# Patient Record
Sex: FEMALE | Race: ASIAN | Employment: UNEMPLOYED | URBAN - METROPOLITAN AREA
[De-identification: names, ages, dates, MRNs, and addresses within clinical notes are randomized per-mention and may not be internally consistent; named-entity substitution may affect disease eponyms.]

---

## 2019-03-07 ENCOUNTER — TRANSFERRED RECORDS (OUTPATIENT)
Dept: MULTI SPECIALTY CLINIC | Facility: CLINIC | Age: 34
End: 2019-03-07

## 2019-03-07 LAB
CHOLEST SERPL-MCNC: 163 MG/DL (ref 100–199)
HDLC SERPL-MCNC: 36 MG/DL
HPV ABSTRACT: NORMAL
LDLC SERPL CALC-MCNC: 102 MG/DL
NONHDLC SERPL-MCNC: 127 MG/DL
PAP-ABSTRACT: NORMAL
TRIGL SERPL-MCNC: 124 MG/DL
TSH SERPL-ACNC: 2.13 UIU/ML (ref 0.35–4.94)

## 2020-06-01 ENCOUNTER — HOSPITAL ENCOUNTER (EMERGENCY)
Facility: CLINIC | Age: 35
Discharge: HOME | End: 2020-06-01
Attending: EMERGENCY MEDICINE | Admitting: EMERGENCY MEDICINE
Payer: COMMERCIAL

## 2020-06-01 ENCOUNTER — NURSE TRIAGE (OUTPATIENT)
Dept: NURSING | Facility: CLINIC | Age: 35
End: 2020-06-01

## 2020-06-01 ENCOUNTER — APPOINTMENT (OUTPATIENT)
Dept: ULTRASOUND IMAGING | Facility: CLINIC | Age: 35
End: 2020-06-01
Attending: EMERGENCY MEDICINE
Payer: COMMERCIAL

## 2020-06-01 VITALS
BODY MASS INDEX: 22.89 KG/M2 | HEART RATE: 91 BPM | DIASTOLIC BLOOD PRESSURE: 78 MMHG | SYSTOLIC BLOOD PRESSURE: 119 MMHG | TEMPERATURE: 98.8 F | RESPIRATION RATE: 16 BRPM | OXYGEN SATURATION: 98 % | HEIGHT: 61 IN | WEIGHT: 121.25 LBS

## 2020-06-01 DIAGNOSIS — R31.9 URINARY TRACT INFECTION WITH HEMATURIA, SITE UNSPECIFIED: ICD-10-CM

## 2020-06-01 DIAGNOSIS — N39.0 URINARY TRACT INFECTION WITH HEMATURIA, SITE UNSPECIFIED: ICD-10-CM

## 2020-06-01 DIAGNOSIS — N83.8 MASS OF RIGHT OVARY: ICD-10-CM

## 2020-06-01 DIAGNOSIS — R10.2 PELVIC PAIN IN FEMALE: ICD-10-CM

## 2020-06-01 LAB
ALBUMIN UR-MCNC: NEGATIVE MG/DL
ANION GAP SERPL CALCULATED.3IONS-SCNC: 6 MMOL/L (ref 3–14)
APPEARANCE UR: ABNORMAL
B-HCG SERPL-ACNC: >1000 IU/L (ref 0–5)
BACTERIA #/AREA URNS HPF: ABNORMAL /HPF
BASOPHILS # BLD AUTO: 0 10E9/L (ref 0–0.2)
BASOPHILS NFR BLD AUTO: 0.3 %
BILIRUB UR QL STRIP: NEGATIVE
BUN SERPL-MCNC: 5 MG/DL (ref 7–30)
CALCIUM SERPL-MCNC: 8.7 MG/DL (ref 8.5–10.1)
CHLORIDE SERPL-SCNC: 108 MMOL/L (ref 94–109)
CO2 SERPL-SCNC: 23 MMOL/L (ref 20–32)
COLOR UR AUTO: ABNORMAL
CREAT SERPL-MCNC: 0.55 MG/DL (ref 0.52–1.04)
DIFFERENTIAL METHOD BLD: ABNORMAL
EOSINOPHIL # BLD AUTO: 0.1 10E9/L (ref 0–0.7)
EOSINOPHIL NFR BLD AUTO: 1.2 %
ERYTHROCYTE [DISTWIDTH] IN BLOOD BY AUTOMATED COUNT: 12.9 % (ref 10–15)
GFR SERPL CREATININE-BSD FRML MDRD: >90 ML/MIN/{1.73_M2}
GLUCOSE SERPL-MCNC: 143 MG/DL (ref 70–99)
GLUCOSE UR STRIP-MCNC: NEGATIVE MG/DL
HCT VFR BLD AUTO: 34.5 % (ref 35–47)
HGB BLD-MCNC: 11.5 G/DL (ref 11.7–15.7)
HGB UR QL STRIP: NEGATIVE
IMM GRANULOCYTES # BLD: 0 10E9/L (ref 0–0.4)
IMM GRANULOCYTES NFR BLD: 0.3 %
KETONES UR STRIP-MCNC: NEGATIVE MG/DL
LEUKOCYTE ESTERASE UR QL STRIP: ABNORMAL
LYMPHOCYTES # BLD AUTO: 2.1 10E9/L (ref 0.8–5.3)
LYMPHOCYTES NFR BLD AUTO: 29.8 %
MCH RBC QN AUTO: 29.6 PG (ref 26.5–33)
MCHC RBC AUTO-ENTMCNC: 33.3 G/DL (ref 31.5–36.5)
MCV RBC AUTO: 89 FL (ref 78–100)
MONOCYTES # BLD AUTO: 0.5 10E9/L (ref 0–1.3)
MONOCYTES NFR BLD AUTO: 6.8 %
NEUTROPHILS # BLD AUTO: 4.3 10E9/L (ref 1.6–8.3)
NEUTROPHILS NFR BLD AUTO: 61.6 %
NITRATE UR QL: NEGATIVE
NRBC # BLD AUTO: 0 10*3/UL
NRBC BLD AUTO-RTO: 0 /100
PH UR STRIP: 6.5 PH (ref 5–7)
PLATELET # BLD AUTO: 330 10E9/L (ref 150–450)
POTASSIUM SERPL-SCNC: 3.6 MMOL/L (ref 3.4–5.3)
RBC # BLD AUTO: 3.89 10E12/L (ref 3.8–5.2)
RBC #/AREA URNS AUTO: 10 /HPF (ref 0–2)
SODIUM SERPL-SCNC: 137 MMOL/L (ref 133–144)
SOURCE: ABNORMAL
SP GR UR STRIP: 1 (ref 1–1.03)
SQUAMOUS #/AREA URNS AUTO: 17 /HPF (ref 0–1)
UROBILINOGEN UR STRIP-MCNC: NORMAL MG/DL (ref 0–2)
WBC # BLD AUTO: 7.2 10E9/L (ref 4–11)
WBC #/AREA URNS AUTO: 37 /HPF (ref 0–5)

## 2020-06-01 PROCEDURE — 80048 BASIC METABOLIC PNL TOTAL CA: CPT | Performed by: EMERGENCY MEDICINE

## 2020-06-01 PROCEDURE — 99284 EMERGENCY DEPT VISIT MOD MDM: CPT | Mod: 25

## 2020-06-01 PROCEDURE — 87086 URINE CULTURE/COLONY COUNT: CPT | Performed by: EMERGENCY MEDICINE

## 2020-06-01 PROCEDURE — 81001 URINALYSIS AUTO W/SCOPE: CPT | Performed by: EMERGENCY MEDICINE

## 2020-06-01 PROCEDURE — 85025 COMPLETE CBC W/AUTO DIFF WBC: CPT | Performed by: EMERGENCY MEDICINE

## 2020-06-01 PROCEDURE — 84702 CHORIONIC GONADOTROPIN TEST: CPT | Performed by: EMERGENCY MEDICINE

## 2020-06-01 PROCEDURE — 76817 TRANSVAGINAL US OBSTETRIC: CPT

## 2020-06-01 RX ORDER — CEPHALEXIN 500 MG/1
500 CAPSULE ORAL 3 TIMES DAILY
Qty: 21 CAPSULE | Refills: 0 | Status: SHIPPED | OUTPATIENT
Start: 2020-06-01 | End: 2020-06-16

## 2020-06-01 RX ORDER — CEPHALEXIN 500 MG/1
500 CAPSULE ORAL ONCE
Status: DISCONTINUED | OUTPATIENT
Start: 2020-06-01 | End: 2020-06-01 | Stop reason: HOSPADM

## 2020-06-01 ASSESSMENT — MIFFLIN-ST. JEOR: SCORE: 1187.38

## 2020-06-01 ASSESSMENT — ENCOUNTER SYMPTOMS
ABDOMINAL PAIN: 1
BLOOD IN STOOL: 0
CONSTIPATION: 0
COUGH: 0
DYSURIA: 0
DIARRHEA: 0
SHORTNESS OF BREATH: 0
FREQUENCY: 1
VOMITING: 0

## 2020-06-01 NOTE — ED NOTES
Pt has had abd discomfort since may 22. She took a pregnancy test yesterday and it was positive. She states her pain is more on the left side of her abdomen.

## 2020-06-01 NOTE — TELEPHONE ENCOUNTER
Positive pregnancy test this weekend/ LMP 4/19/past couple of days LLQ abdominal pain/at first she thought she was having her menses but the pain gets severe when she sneezes//worried about ectopic pregnancy/  will drive her to the er at [facility] Luis M Be RN [phone]    Reason for Disposition    MODERATE-SEVERE abdominal pain (e.g., interferes with normal activities, awakens from sleep)    Additional Information    Negative: Passed out (i.e., lost consciousness, collapsed and was not responding)    Negative: Shock suspected (e.g., cold/pale/clammy skin, too weak to stand, low BP, rapid pulse)    Negative: Difficult to awaken or acting confused (e.g., disoriented, slurred speech)    Negative: Sounds like a life-threatening emergency to the triager    Negative: Followed an abdomen (stomach) injury    Negative: [1] Abdominal pain AND [2] pregnant > 20 weeks    Protocols used: PREGNANCY - ABDOMINAL PAIN LESS THAN 20 WEEKS EG-A-

## 2020-06-01 NOTE — ED PROVIDER NOTES
"  History     Chief Complaint:  Abdominal Pain     The history is provided by the patient.      Sherrill James is a A0 pregnant 34 year old female who presents for lower abdominal pain. The patient reports she has had intermittent left lower abdominal pain since 20 with associated urinary frequency and urgency. She states her last period was  and she has had fairly regular periods for the past 5-6 months. She reports taking a home pregnancy test yesterday which was positive. She denies vaginal bleeding, discharge, vomiting, diarrhea, blood in stool, constipation, dysuria, chest pain, shortness of breath, cough, and history of bladder infection or kidney stones.  She states her blood type is A+. She notes her son was born in [place] but believed [facility] has records.     Allergies:  No Known Drug Allergies    Medications:    The patient is not currently taking any prescribed medications.    Past Medical History:    History reviewed. No pertinent past medical history.    Past Surgical History:    History reviewed.  No pertinent past surgical history.    Family History:    History reviewed. No pertinent family history.    Social History:  The patient presents to the ED alone.  PCP: No primary care provider on file.     Review of Systems   Respiratory: Negative for cough and shortness of breath.    Cardiovascular: Negative for chest pain.   Gastrointestinal: Positive for abdominal pain. Negative for blood in stool, constipation, diarrhea and vomiting.   Genitourinary: Positive for frequency and urgency. Negative for dysuria, vaginal bleeding and vaginal discharge.   All other systems reviewed and are negative.      Physical Exam     Patient Vitals for the past 24 hrs:   BP Temp Pulse Heart Rate SpO2 Height Weight   20 1456 128/81 98.8  F (37.1  C) 97 97 97 % 1.549 m (5' 1\") 55 kg (121 lb 4.1 oz)       Physical Exam  Physical Exam   General:  Sitting on bed at bedside.   HENT:  No obvious trauma to " head  Right Ear:  External ear normal.   Left Ear:  External ear normal.   Nose:  Nose normal.   Eyes:  Conjunctivae and EOM are normal. Pupils are equal, round, and reactive.   Neck: Normal range of motion. Neck supple. No tracheal deviation present.   CV:  Normal heart sounds. No murmur heard.  Pulm/Chest: Effort normal and breath sounds normal.   Abd: Soft. No distension. There is no tenderness, specifically no lower pelvic tenderness. There is no rigidity, no rebound and no guarding. Negative Murphies and negative McBurnies.    M/S: Normal range of motion.   Neuro: Alert. GCS 15.  Skin: Skin is warm and dry. No rash noted. Not diaphoretic.   Psych: Normal mood and affect. Behavior is normal.     Emergency Department Course   Imaging:  Radiology findings were communicated with the patient who voiced understanding of the findings.    US OB <14 Weeks w Transvaginal Single  1. Intrauterine 0.5 cm gestational sac. Lack of cardiac activity and   fetal pole likely related to early gestational age. Short interval   follow-up and serial beta hCG suggested.   2. Large right adnexal mass is likely a dermoid. Pelvic MRI may be   helpful for further characterization.   As read by Radiology.    Laboratory:  Laboratory findings were communicated with the patient who voiced understanding of the findings.    CBC: HGB 11.5 (L) o/w WNL (WBC 7.2, )  BMP: Glucose 143 (H), BUN 5 (L) o/w WNL (Creatinine 0.55)  UA: Large leukocyte esterase, WBC/HPF 37 (H), RBC/HPF 10 (H), Many bacteria, Squamous epithelial/HPF 17 (H) o/w WNL  HCG Quantitative Pregnancy: >1000 (H)  Urine culture: pending    Interventions:  Keflex 500 mg PO    Emergency Department Course:  Past medical records, nursing notes, and vitals reviewed.  1508: I performed an exam of the patient and obtained history, as documented above.  The patient was sent for a OB ultrasound while in the emergency department, findings above.  IV inserted and blood drawn.  The patient  provided a urine sample here in the emergency department. This was sent for laboratory testing, findings above.    1658: I rechecked the patient.  Findings and plan explained to the Patient. Patient discharged home with instructions regarding supportive care, medications, and reasons to return. The importance of close follow-up was reviewed.     Impression & Plan   Medical Decision Making:  Sherrill James is a very pleasant 34 year old year old patient who presents to the emergency department with concern of dysuria and increased urinary frequency.  The patient is concerned about a bladder infection.  She was also concerned because she had very slight left lower quadrant pain.  The patient has a benign, nonsurgical abdomen.  Labs are unremarkable.  Her quant is elevated consistent with early pregnancy.  She is sure that her blood type is A+.  She does not require RhoGam because of this and she has not had any vaginal bleeding anyways.  The urine analysis shows evidence of infection.  A urine culture is pending.  She was given Keflex here and a prescription for this.  I discussed that having pelvic pain in early pregnancy can be a sign of early miscarriage.  Recommended she return with any worsening pain or bleeding.  Incidentally there is a large mass on the right ovary.  There is blood flow to it.  Ovarian torsion is unlikely.  The patient's pain is in the left lower quadrant and is minimal and she has a nonsurgical abdomen.  I recommended she follow-up with OB/GYN and likely outpatient MRI per radiology's recommendation.  I considered the very rare presentation of this being a heterotopic pregnancy and reviewed this with the patient.  I advised her to return immediately with any worsening pain or vaginal bleeding.  The patient expressed verbal understanding and agreement.    The treatment plan was discussed with the patient and they expressed understanding of this plan and consented to the plan.  In addition,  the patient will return to the emergency department if their symptoms persist, worsen, if new symptoms arise or if there is any concern as other pathology may be present that is not evident at this time. They also understand the importance of close follow up in the clinic and if unable to do so will return to the emergency department for a reevaluation. All questions were answered.    Diagnosis:    ICD-10-CM    1. Urinary tract infection with hematuria, site unspecified  N39.0     R31.9    2. Pelvic pain in female  R10.2    3. Mass of right ovary  N83.8        Disposition:  Discharged to home.    Discharge Medications:  New Prescriptions    CEPHALEXIN (KEFLEX) 500 MG CAPSULE    Take 1 capsule (500 mg) by mouth 3 times daily for 7 days     Scribe Disclosure:  Binu RODRIGUEZ, am serving as a scribe at 3:08 PM on 6/1/2020 to document services personally performed by Sam Vann DO based on my observations and the provider's statements to me.        Sam Vann DO  06/01/20 [id number]

## 2020-06-01 NOTE — ED TRIAGE NOTES
Patient had home pregnancy test positive, having abdomen pain last 4-5 hours. Was told to come to ED for evaluation.

## 2020-06-01 NOTE — ED AVS SNAPSHOT
Emergency Department  [address] [facility] [address]  Phone:  [phone]  Fax:  [phone]                                    Ms. Sherrill James   MRN: [id number]    Department:   Emergency Department   Date of Visit:  6/1/2020           After Visit Summary Signature Page    I have received my discharge instructions, and my questions have been answered. I have discussed any challenges I see with this plan with the nurse or doctor.    ..........................................................................................................................................  Patient/Patient Representative Signature      ..........................................................................................................................................  Patient Representative Print Name and Relationship to Patient    ..................................................               ................................................  Date                                   Time    ..........................................................................................................................................  Reviewed by Signature/Title    ...................................................              ..............................................  Date                                               Time          22EPIC Rev 08/18

## 2020-06-02 LAB
BACTERIA SPEC CULT: NORMAL
BACTERIA SPEC CULT: NORMAL
Lab: NORMAL
SPECIMEN SOURCE: NORMAL

## 2020-06-12 DIAGNOSIS — O36.80X0 PREGNANCY WITH INCONCLUSIVE FETAL VIABILITY: Primary | ICD-10-CM

## 2020-06-12 NOTE — PROGRESS NOTES
Pt has NOB scheduled with ultrasound. Needed order to be placed. Future ultrasound order placed and linked with appt. Closing encounter.   Chantel Tapia RN on 6/12/2020 at 9:25 AM

## 2020-06-15 NOTE — PROGRESS NOTES
"  SUBJECTIVE:                                                   Sherrill James is a 34 year old female who presents to clinic today for the following health issue(s):  Patient presents with:  Ultrasound      HPI:  Here for viability ultrasound. Pt was seen in ER for pain and ultrasound showed 5weeks sac. No fetal pole. Also found 9cm Dermoid cyst of right ovary.  Pt feeling well. No bleeding.   Ultrasound today shows 7 week viable IUP and 9cm dermoid.   PMH:  section first pregnancy    Patient's last menstrual period was 2020..     Patient is sexually active, .  Using none for contraception.    reports that she has never smoked. She has never used smokeless tobacco.    STD testing offered?  Declined    Health maintenance updated:  yes    Today's PHQ-2 Score: No flowsheet data found.  Today's PHQ-9 Score:   PHQ-9 SCORE 2020   PHQ-9 Total Score 0     Today's JOSE JUAN-7 Score:   JOSE JUAN-7 SCORE 2020   Total Score 0       Problem list and histories reviewed & adjusted, as indicated.  Additional history: as documented.    There is no problem list on file for this patient.    Past Surgical History:   Procedure Laterality Date     NO HISTORY OF SURGERY        Social History     Tobacco Use     Smoking status: Never Smoker     Smokeless tobacco: Never Used   Substance Use Topics     Alcohol use: Never     Frequency: Never      Problem (# of Occurrences) Relation (Name,Age of Onset)    No Known Problems (7) Mother, Father, Sister, Brother, Maternal Grandmother, Maternal Grandfather, Paternal Grandmother            Current Outpatient Medications   Medication Sig     Prenatal Vit-Fe Fumarate-FA (PRENATAL VITAMIN PO)      No current facility-administered medications for this visit.      No Known Allergies    ROS:  12 point review of systems negative other than symptoms noted below or in the HPI.        OBJECTIVE:     /74   Pulse 84   Ht 1.549 m (5' 1\")   Wt 55.8 kg (123 lb)   LMP 2020   " BMI 23.24 kg/m    Body mass index is 23.24 kg/m .    Exam:  Constitutional:  Appearance: Well nourished, well developed alert, in no acute distress  Neurologic:  Mental Status:  Oriented X3.  Normal strength and tone, sensory exam grossly normal, mentation intact and speech normal.    Psychiatric:  Mentation appears normal and affect normal/bright.     In-Clinic Test Results:  Results for orders placed or performed in visit on 06/16/20 (from the past 24 hour(s))   US OB Transvaginal Only    Narrative    US OB Transvaginal Only   Order #: [id number] Accession #: [id number]   Study Notes      Kaitlin Paul on 6/16/2020  8:43 AM       Obstetrical Ultrasound Report  OB U/S  < 14 Weeks -  Transvaginal  [facility]  Referring Provider: Dr. Addison Alegria  Sonographer: Kaitlin Paul RDMS  Indication:  Viability check      Dating (mm/dd/yyyy):   LMP: 04/19/20                 EDC:  01/24/21  GA by LMP:         8w2d     Current Scan On:  6/16/2020          EDC:  02/01/21  GA by Current Scan:        7w1d  The calculation of the gestational age by current scan was based on CRL.  Anatomy Scan:  Pacheco gestation.  Biometry:  CRL                       .98 cm                  7w1d                      Yolk Sac               2.7 mm                                                     Fetal heart activity:  Rate and rhythm is within normal limits.  Fetal   heart rate: 122 bpm  Findings: Viable IUP     Maternal Structures:  Cervix: The cervix appears long and closed.  Right Ovary: Dermoid 9.5x 7x 4.6cm  Left Ovary: Multiple small cysts on periphery  Impression: Normal interval growth of single, viable intrauterine   pregnancy. EDC should be 2/1/21. Right ovarian dermoid still present and   unchanged. Normal left ovary.   Addison Alegria MD                      ASSESSMENT/PLAN:                                                        ICD-10-CM    1. High-risk pregnancy in first trimester  O09.91    2. Dermoid  cyst  D36.9    3. History of  section complicating pregnancy  O34.219          EDC will be 21  Discussed Dermoid. Will need ultrasound at 14 weeks and refer to GYN onc for removal.   Pt given notes and questions answered. Discussed potential for torsion post partum if not removed. Discussed potential difficulty with excision at time of .  RTC in 3 weeks for SHABANA Alegria MD  [facility]

## 2020-06-16 ENCOUNTER — OFFICE VISIT (OUTPATIENT)
Dept: OBGYN | Facility: CLINIC | Age: 35
End: 2020-06-16
Payer: COMMERCIAL

## 2020-06-16 ENCOUNTER — ANCILLARY PROCEDURE (OUTPATIENT)
Dept: ULTRASOUND IMAGING | Facility: CLINIC | Age: 35
End: 2020-06-16
Payer: COMMERCIAL

## 2020-06-16 VITALS
BODY MASS INDEX: 23.22 KG/M2 | WEIGHT: 123 LBS | SYSTOLIC BLOOD PRESSURE: 116 MMHG | DIASTOLIC BLOOD PRESSURE: 74 MMHG | HEART RATE: 84 BPM | HEIGHT: 61 IN

## 2020-06-16 DIAGNOSIS — O34.219 HISTORY OF CESAREAN SECTION COMPLICATING PREGNANCY: ICD-10-CM

## 2020-06-16 DIAGNOSIS — D36.9 DERMOID CYST: ICD-10-CM

## 2020-06-16 DIAGNOSIS — O36.80X0 PREGNANCY WITH INCONCLUSIVE FETAL VIABILITY: ICD-10-CM

## 2020-06-16 DIAGNOSIS — O09.91 HIGH-RISK PREGNANCY IN FIRST TRIMESTER: Primary | ICD-10-CM

## 2020-06-16 PROCEDURE — 76817 TRANSVAGINAL US OBSTETRIC: CPT | Performed by: OBSTETRICS & GYNECOLOGY

## 2020-06-16 PROCEDURE — 99202 OFFICE O/P NEW SF 15 MIN: CPT | Performed by: OBSTETRICS & GYNECOLOGY

## 2020-06-16 SDOH — HEALTH STABILITY: MENTAL HEALTH: HOW OFTEN DO YOU HAVE A DRINK CONTAINING ALCOHOL?: NEVER

## 2020-06-16 ASSESSMENT — ANXIETY QUESTIONNAIRES
5. BEING SO RESTLESS THAT IT IS HARD TO SIT STILL: NOT AT ALL
6. BECOMING EASILY ANNOYED OR IRRITABLE: NOT AT ALL
7. FEELING AFRAID AS IF SOMETHING AWFUL MIGHT HAPPEN: NOT AT ALL
1. FEELING NERVOUS, ANXIOUS, OR ON EDGE: NOT AT ALL
3. WORRYING TOO MUCH ABOUT DIFFERENT THINGS: NOT AT ALL
2. NOT BEING ABLE TO STOP OR CONTROL WORRYING: NOT AT ALL
GAD7 TOTAL SCORE: 0

## 2020-06-16 ASSESSMENT — PATIENT HEALTH QUESTIONNAIRE - PHQ9
SUM OF ALL RESPONSES TO PHQ QUESTIONS 1-9: 0
5. POOR APPETITE OR OVEREATING: NOT AT ALL

## 2020-06-16 ASSESSMENT — MIFFLIN-ST. JEOR: SCORE: 1195.3

## 2020-06-16 NOTE — Clinical Note
Please abstract the following data from this visit with this patient into the appropriate field in Epic:    Other Tests found in the patient's chart through Chart Review/Care Everywhere:    Pap smear done by this group Cammie this date: 3/7/19    Note to [employer]: If this section is blank, no results were found via Chart Review/Care Everywhere.

## 2020-06-17 ASSESSMENT — ANXIETY QUESTIONNAIRES: GAD7 TOTAL SCORE: 0

## 2020-06-19 ENCOUNTER — TELEPHONE (OUTPATIENT)
Dept: OBGYN | Facility: CLINIC | Age: 35
End: 2020-06-19

## 2020-06-19 DIAGNOSIS — O09.90 SUPERVISION OF HIGH-RISK PREGNANCY: Primary | ICD-10-CM

## 2020-06-19 DIAGNOSIS — D36.9 DERMOID CYST: ICD-10-CM

## 2020-06-19 NOTE — TELEPHONE ENCOUNTER
Patients  called and asked to speak with a nurse regarding scheduling his wife per notes from the last appointment with Dr Alegria on 6/16: Discussed Dermoid. Will need ultrasound at 14 weeks and refer to GYN onc for removal.   Please call back at [phone]

## 2020-06-19 NOTE — TELEPHONE ENCOUNTER
"Returned husbands call  They are asking to have the \"14week US\" ASAP so they can take care of the dermoid cyst prior to their trip back to [facility] July 15th.   Discussed the risk in 1st trimester thus the reason for the 14week f/u and then referral if dermoid cyst still there.   asked if they terminated pregnancy, can this be taken care of?    Consulted Dr Alegria - GYN oncology won't normally see her before 16 weeks but he can send the referral for consult and discussion asap to avoid TAB.  Referral placed and number and instructions given to pt's . They can discuss and be advised by the GYN Oncologist how to proceed.    Reminded to schedule new OB visit. He will call back.    Valerie Lehman RN on 6/19/2020 at 1:42 PM    "

## 2020-06-24 NOTE — TELEPHONE ENCOUNTER
Oncology/Surgical Oncology Referral Request:     Specialty Requested: Medical Oncology     Referring Provider: Addison Alegria MD     Referring Clinic/Organization: [facility]    Records location: [facility]     Requested Provider (if specified): Dr.Colleen Octavio Montanez        wanted Steph, and prior to travel, Scheduled with Betty Marsh MD to meet needs

## 2020-06-25 NOTE — TELEPHONE ENCOUNTER
RECORDS STATUS - ALL OTHER DIAGNOSIS      RECORDS RECEIVED FROM: Epic/   DATE RECEIVED: 6/29/2020    NOTES STATUS DETAILS   OFFICE NOTE from referring provider COmplete  Addison Alegria MD    OFFICE NOTE from medical oncologist N/A    DISCHARGE SUMMARY from hospital N/A    DISCHARGE REPORT from the ER Complete [facility]- 6/1/2020 UTI    OPERATIVE REPORT N/A    MEDICATION LIST Complete [facility]   CLINICAL TRIAL TREATMENTS TO DATE N/A    LABS     PATHOLOGY REPORTS N/A    ANYTHING RELATED TO DIAGNOSIS complete Labs last updated on 6/1/2020   GENONOMIC TESTING     TYPE:     IMAGING (NEED IMAGES & REPORT)     CT SCANS     MRI     MAMMO     ULTRASOUND Complete US OB Transvaginal 6/16/2020   US OB 6/1/2020    PET

## 2020-06-29 ENCOUNTER — PRE VISIT (OUTPATIENT)
Dept: ONCOLOGY | Facility: CLINIC | Age: 35
End: 2020-06-29

## 2020-06-29 ENCOUNTER — VIRTUAL VISIT (OUTPATIENT)
Dept: ONCOLOGY | Facility: CLINIC | Age: 35
End: 2020-06-29
Attending: OBSTETRICS & GYNECOLOGY
Payer: COMMERCIAL

## 2020-06-29 VITALS — WEIGHT: 121.25 LBS | HEIGHT: 61 IN | BODY MASS INDEX: 22.89 KG/M2

## 2020-06-29 DIAGNOSIS — N83.8 OVARIAN MASS: Primary | ICD-10-CM

## 2020-06-29 DIAGNOSIS — Z3A.11 11 WEEKS GESTATION OF PREGNANCY: ICD-10-CM

## 2020-06-29 PROCEDURE — 40001009 ZZH VIDEO/TELEPHONE VISIT; NO CHARGE

## 2020-06-29 PROCEDURE — 99204 OFFICE O/P NEW MOD 45 MIN: CPT | Mod: 95 | Performed by: OBSTETRICS & GYNECOLOGY

## 2020-06-29 ASSESSMENT — PAIN SCALES - GENERAL: PAINLEVEL: NO PAIN (0)

## 2020-06-29 ASSESSMENT — MIFFLIN-ST. JEOR: SCORE: 1187.38

## 2020-06-29 NOTE — PATIENT INSTRUCTIONS
Diagnosis  1. Pregnancy at 11+0 weeks  2. Right Ovarian Mass 9.5 x 7 x 4.6 cm    Plan:  1. I have notified your OB doctor to schedule you for an MRI if it is possible.   2. Follow up in [place] as soon as possible when you establish care. They need to measure the ovary and compare to the US done in June. Please take a copy of that report with you.  3. Make sure you are walking to avoid blood clots during your long flight  4. Please call our office if you have any new pelvic pain    Safe travels to [place]      Betty Marsh M.D., MPH,  F.A.C.O.G.  Professor  Division of Gynecologic Oncology  [facility] [employer] [place]      No scheduling needed today/rafael

## 2020-06-29 NOTE — PROGRESS NOTES
"Sherrill James is a 34 year old female who is being evaluated via a billable video visit.      The patient has been notified of following:     \"This video visit will be conducted via a call between you and your physician/provider. We have found that certain health care needs can be provided without the need for an in-person physical exam.  This service lets us provide the care you need with a video conversation.  If a prescription is necessary we can send it directly to your pharmacy.  If lab work is needed we can place an order for that and you can then stop by our lab to have the test done at a later time.    Video visits are billed at different rates depending on your insurance coverage.  Please reach out to your insurance provider with any questions.    If during the course of the call the physician/provider feels a video visit is not appropriate, you will not be charged for this service.\"    Patient has given verbal consent for Video visit? Yes  How would you like to obtain your AVS? Phamhart  Patient would like the video invitation sent by: Send to e-mail at: [email or web address]  Will anyone else be joining your video visit? Yes:  janey. How would they like to receive their invitation? Send to e-mail at: [email or web address]        Video-Visit Details    Type of service:  Video Visit    Video Start Time:    Video End Time:    Originating Location (pt. Location): Home    Distant Location (provider location):  [facility]     Platform used for Video Visit:        Rema Haas CMA    GYNECOLOGIC  ONCOLOGY CONSULT    Referring provider:    Addison Alegria MD  [address] [place] [address]       RE: Sherrill James  : 1985  GERALDINE: 2020    CC: Right Ovarian Mass, Pregnancy    HPI: Ms Sherrill James is a 34 year  at 11+0 weeks of gestation with LMP 20.  She presents today for right ovarian complex mass.    Today's visit was " completed via video due to ongoing COVID-19 social distancing.  The visit included the patient and her .     She reports feeling well until presenting to the emergency room on 2020 due to left lower abdominal pain.  At that time a pelvic ultrasound was performed showing a right ovarian mass.  Today the patient reports she is not experiencing any pain.  Per her  there is a plan for them to return to [facility] on July 15 and they have many questions about transfer of care.    Patient also denies any vaginal bleeding, abnormal discharge, no cough or shortness of breath. Reports a history of  with her first pregnancy 6 years ago.    20 OB Ultrasound  Maternal Structures:  Cervix: The cervix appears long and closed.  Right Ovary: Dermoid 9.5x 7x 4.6cm  Left Ovary: Multiple small cysts on periphery  Impression: Normal interval growth of single, viable intrauterine pregnancy. EDC should be 21. Right ovarian dermoid still present and unchanged. Normal left ovary.       OBGYN history and Health Maintenance:    Last Pap Smear:  Negative/ HPV Negative      Review of Systems:  Systemic:No weight changes.    Skin : No skin changes or new lesions.   Eye : No changes in vision.   Pulmonary: No cough or SOB.   Cardiovascular: No CP or palpitations  Gastrointestinal : No diarrhea, constipation, abdominal pain. Bowel habits normal.   Genitourinary: No dysuria, urgency or bleeding  Psychiatric: No depression or anxiety  Hematologic : No palpable lymph nodes.   Endocrine : No hot flashes. No heat/cold intolerance.      Neurological: No headaches, no numbness.     History reviewed. No pertinent past medical history.    Past Surgical History:   Procedure Laterality Date      SECTION       NO HISTORY OF SURGERY            Current Outpatient Medications   Medication Sig Dispense Refill     Prenatal Vit-Fe Fumarate-FA (PRENATAL VITAMIN PO)            No Known Allergies    Social  "History:  Social History     Tobacco Use     Smoking status: Never Smoker     Smokeless tobacco: Never Used   Substance Use Topics     Alcohol use: Never     Frequency: Never         Family History:   The patient's family history is notable for   Family History   Problem Relation Age of Onset     No Known Problems Mother      No Known Problems Father      No Known Problems Sister      No Known Problems Brother      No Known Problems Maternal Grandmother      No Known Problems Maternal Grandfather      No Known Problems Paternal Grandmother          Physical Exam:   Ht 1.549 m (5' 1\")   Wt 55 kg (121 lb 4.1 oz)   LMP 2020   BMI 22.91 kg/m    Body mass index is 22.91 kg/m .    Assessment:    Sherrill James is a 34 year old woman with a  with IUP at 11+0 weeks.  Right complex ovarian mass 9.5 x 7 cm  Prior history of   ECOG performance status 0        Plan:     1.)   I reviewed today's images and discussed the finding of the right ovarian mass with the patient and her .  I assured them overall ovarian masses in pregnancy are benign.  The risk of malignancy is low but is more likely associated with evidence of increasing size of the ovarian mass with associated symptoms such as ascites.    The recommended approach to management is surgical intervention if there is acute pain.  Beyond the symptoms indication for surgical intervention is increasing ovarian mass, complex ovarian mass greater than 10 cm and personal or family history of ovarian cancer.  The optimal time for surgical intervention if warranted is in the second trimester.  Tumor marker testing is not routinely obtained in the setting of pregnancy as the interpretation can be complicated by the presence of antigens secreted during the course of pregnancy, although tumor markers can be very helpful in follow-up should there be diagnosis of cancer.     The pelvic ultrasound reports possible dermoid and obtaining an MRI would " help to further characterize the mass.  We will consult with her primary OB/GYN clinic and obtained an MRI.     Patient has an upcoming visit with her OB/GYN in the next 2 weeks.  I have informed her that if she has any acute pain she should notify our clinic for more immediate intervention.  As she is currently at 11 weeks and planning to travel in the next 2 weeks it is safe for her to do so and have a follow-up in [facility] to determine any increase in the size of the mass and consider possible surgical intervention if warranted.  Both a laparoscopy and laparotomy and be considered based on the character of the mass, size and risk of malignancy. Alterntively if the mass is decreasing in size, further evaluation can be performed with her planned  (per patient report).    All of the patient's and her  questions were answered today.    Neck steps for follow-up including obtaining MRI and follow-up in the GYN oncology clinic with any acute symptom ahead of her upcoming travel.      2.) Genetic risk factors were assessed: None today    3.) Labs and/or tests ordered include:  None today         Betty Marsh M.D., MPH,  F.A.C.O.G.  Professor  Division of Gynecologic Oncology  [facility]/[employer] [place]        Total video visit time 35 minutes

## 2020-06-29 NOTE — LETTER
"    6/29/2020         RE: Sherrill James  [address] [place] [address]        Dear Colleague,    Thank you for referring your patient, Sherrill James, to the [facility]. Please see a copy of my visit note below.    Sherrill James is a 34 year old female who is being evaluated via a billable video visit.      The patient has been notified of following:     \"This video visit will be conducted via a call between you and your physician/provider. We have found that certain health care needs can be provided without the need for an in-person physical exam.  This service lets us provide the care you need with a video conversation.  If a prescription is necessary we can send it directly to your pharmacy.  If lab work is needed we can place an order for that and you can then stop by our lab to have the test done at a later time.    Video visits are billed at different rates depending on your insurance coverage.  Please reach out to your insurance provider with any questions.    If during the course of the call the physician/provider feels a video visit is not appropriate, you will not be charged for this service.\"    Patient has given verbal consent for Video visit? Yes  How would you like to obtain your AVS? MyChart  Patient would like the video invitation sent by: Send to e-mail at: [email or web address]  Will anyone else be joining your video visit? Yes:  janey. How would they like to receive their invitation? Send to e-mail at: [email or web address]        Video-Visit Details    Type of service:  Video Visit    Video Start Time:    Video End Time:    Originating Location (pt. Location): Home    Distant Location (provider location):  [facility]     Platform used for Video Visit:        Rema Haas CMA    GYNECOLOGIC  ONCOLOGY CONSULT    Referring provider:    Addison Alegria MD  [address]  [place] [address]       RE: Sherrill" Jacob  : 1985  GERALDINE: 2020    CC: Right Ovarian Mass, Pregnancy    HPI: Ms Sherrill James is a 34 year  at 11+0 weeks of gestation with LMP 20.  She presents today for right ovarian complex mass.    Today's visit was completed via video due to ongoing COVID-19 social distancing.  The visit included the patient and her .     She reports feeling well until presenting to the emergency room on 2020 due to left lower abdominal pain.  At that time a pelvic ultrasound was performed showing a right ovarian mass.  Today the patient reports she is not experiencing any pain.  Per her  there is a plan for them to return to [facility] on July 15 and they have many questions about transfer of care.    Patient also denies any vaginal bleeding, abnormal discharge, no cough or shortness of breath. Reports a history of  with her first pregnancy 6 years ago.    20 OB Ultrasound  Maternal Structures:  Cervix: The cervix appears long and closed.  Right Ovary: Dermoid 9.5x 7x 4.6cm  Left Ovary: Multiple small cysts on periphery  Impression: Normal interval growth of single, viable intrauterine pregnancy. EDC should be 21. Right ovarian dermoid still present and unchanged. Normal left ovary.       OBGYN history and Health Maintenance:    Last Pap Smear: 2019 Negative/ HPV Negative      Review of Systems:  Systemic:No weight changes.    Skin : No skin changes or new lesions.   Eye : No changes in vision.   Pulmonary: No cough or SOB.   Cardiovascular: No CP or palpitations  Gastrointestinal : No diarrhea, constipation, abdominal pain. Bowel habits normal.   Genitourinary: No dysuria, urgency or bleeding  Psychiatric: No depression or anxiety  Hematologic : No palpable lymph nodes.   Endocrine : No hot flashes. No heat/cold intolerance.      Neurological: No headaches, no numbness.     History reviewed. No pertinent past medical history.    Past Surgical History:  "  Procedure Laterality Date      SECTION       NO HISTORY OF SURGERY            Current Outpatient Medications   Medication Sig Dispense Refill     Prenatal Vit-Fe Fumarate-FA (PRENATAL VITAMIN PO)            No Known Allergies    Social History:  Social History     Tobacco Use     Smoking status: Never Smoker     Smokeless tobacco: Never Used   Substance Use Topics     Alcohol use: Never     Frequency: Never         Family History:   The patient's family history is notable for   Family History   Problem Relation Age of Onset     No Known Problems Mother      No Known Problems Father      No Known Problems Sister      No Known Problems Brother      No Known Problems Maternal Grandmother      No Known Problems Maternal Grandfather      No Known Problems Paternal Grandmother          Physical Exam:   Ht 1.549 m (5' 1\")   Wt 55 kg (121 lb 4.1 oz)   LMP 2020   BMI 22.91 kg/m    Body mass index is 22.91 kg/m .    Assessment:    Sherrill James is a 34 year old woman with a  with IUP at 11+0 weeks.  Right complex ovarian mass 9.5 x 7 cm  Prior history of   ECOG performance status 0        Plan:     1.)   I reviewed today's images and discussed the finding of the right ovarian mass with the patient and her .  I assured them overall ovarian masses in pregnancy are benign.  The risk of malignancy is low but is more likely associated with evidence of increasing size of the ovarian mass with associated symptoms such as ascites.    The recommended approach to management is surgical intervention if there is acute pain.  Beyond the symptoms indication for surgical intervention is increasing ovarian mass, complex ovarian mass greater than 10 cm and personal or family history of ovarian cancer.  The optimal time for surgical intervention if warranted is in the second trimester.  Tumor marker testing is not routinely obtained in the setting of pregnancy as the interpretation can be " complicated by the presence of antigens secreted during the course of pregnancy, although tumor markers can be very helpful in follow-up should there be diagnosis of cancer.     The pelvic ultrasound reports possible dermoid and obtaining an MRI would help to further characterize the mass.  We will consult with her primary OB/GYN clinic and obtained an MRI.     Patient has an upcoming visit with her OB/GYN in the next 2 weeks.  I have informed her that if she has any acute pain she should notify our clinic for more immediate intervention.  As she is currently at 11 weeks and planning to travel in the next 2 weeks it is safe for her to do so and have a follow-up in [facility] to determine any increase in the size of the mass and consider possible surgical intervention if warranted.  Both a laparoscopy and laparotomy and be considered based on the character of the mass, size and risk of malignancy. Alterntively if the mass is decreasing in size, further evaluation can be performed with her planned  (per patient report).    All of the patient's and her  questions were answered today.    Neck steps for follow-up including obtaining MRI and follow-up in the GYN oncology clinic with any acute symptom ahead of her upcoming travel.      2.) Genetic risk factors were assessed: None today    3.) Labs and/or tests ordered include:  None today         Betty Marsh M.D., MPH,  F.A.C.O.G.  Professor  Division of Gynecologic Oncology  [facility]/[employer] [place]        Total video visit time 35 minutes      Again, thank you for allowing me to participate in the care of your patient.        Sincerely,        Betty Marsh MD

## 2020-06-29 NOTE — LETTER
"    6/29/2020         RE: Sherrill James  [address] [place] [address]        Dear Colleague,    Thank you for referring your patient, Sherrill James, to the [facility]. Please see a copy of my visit note below.    Sherrill James is a 34 year old female who is being evaluated via a billable video visit.      The patient has been notified of following:     \"This video visit will be conducted via a call between you and your physician/provider. We have found that certain health care needs can be provided without the need for an in-person physical exam.  This service lets us provide the care you need with a video conversation.  If a prescription is necessary we can send it directly to your pharmacy.  If lab work is needed we can place an order for that and you can then stop by our lab to have the test done at a later time.    Video visits are billed at different rates depending on your insurance coverage.  Please reach out to your insurance provider with any questions.    If during the course of the call the physician/provider feels a video visit is not appropriate, you will not be charged for this service.\"    Patient has given verbal consent for Video visit? Yes  How would you like to obtain your AVS? MyChart  Patient would like the video invitation sent by: Send to e-mail at: [email or web address]  Will anyone else be joining your video visit? Yes:  janey. How would they like to receive their invitation? Send to e-mail at: [email or web address]        Video-Visit Details    Type of service:  Video Visit    Video Start Time:    Video End Time:    Originating Location (pt. Location): Home    Distant Location (provider location):  [facility]     Platform used for Video Visit:        Rema Haas CMA    GYNECOLOGIC  ONCOLOGY CONSULT    Referring provider:    Addison Alegria MD  [address]  [place] [address]       RE: Sherrill" Jacob  : 1985  GERALDINE: 2020    CC: Right Ovarian Mass, Pregnancy    HPI: Ms Sherrill James is a 34 year  at 11+0 weeks of gestation with LMP 20.  She presents today for right ovarian complex mass.    Today's visit was completed via video due to ongoing COVID-19 social distancing.  The visit included the patient and her .     She reports feeling well until presenting to the emergency room on 2020 due to left lower abdominal pain.  At that time a pelvic ultrasound was performed showing a right ovarian mass.  Today the patient reports she is not experiencing any pain.  Per her  there is a plan for them to return to [facility] on July 15 and they have many questions about transfer of care.    Patient also denies any vaginal bleeding, abnormal discharge, no cough or shortness of breath. Reports a history of  with her first pregnancy 6 years ago.    20 OB Ultrasound  Maternal Structures:  Cervix: The cervix appears long and closed.  Right Ovary: Dermoid 9.5x 7x 4.6cm  Left Ovary: Multiple small cysts on periphery  Impression: Normal interval growth of single, viable intrauterine pregnancy. EDC should be 21. Right ovarian dermoid still present and unchanged. Normal left ovary.       OBGYN history and Health Maintenance:    Last Pap Smear: 2019 Negative/ HPV Negative      Review of Systems:  Systemic:No weight changes.    Skin : No skin changes or new lesions.   Eye : No changes in vision.   Pulmonary: No cough or SOB.   Cardiovascular: No CP or palpitations  Gastrointestinal : No diarrhea, constipation, abdominal pain. Bowel habits normal.   Genitourinary: No dysuria, urgency or bleeding  Psychiatric: No depression or anxiety  Hematologic : No palpable lymph nodes.   Endocrine : No hot flashes. No heat/cold intolerance.      Neurological: No headaches, no numbness.     History reviewed. No pertinent past medical history.    Past Surgical History:  "  Procedure Laterality Date      SECTION       NO HISTORY OF SURGERY            Current Outpatient Medications   Medication Sig Dispense Refill     Prenatal Vit-Fe Fumarate-FA (PRENATAL VITAMIN PO)            No Known Allergies    Social History:  Social History     Tobacco Use     Smoking status: Never Smoker     Smokeless tobacco: Never Used   Substance Use Topics     Alcohol use: Never     Frequency: Never         Family History:   The patient's family history is notable for   Family History   Problem Relation Age of Onset     No Known Problems Mother      No Known Problems Father      No Known Problems Sister      No Known Problems Brother      No Known Problems Maternal Grandmother      No Known Problems Maternal Grandfather      No Known Problems Paternal Grandmother          Physical Exam:   Ht 1.549 m (5' 1\")   Wt 55 kg (121 lb 4.1 oz)   LMP 2020   BMI 22.91 kg/m    Body mass index is 22.91 kg/m .    Assessment:    Sherrill James is a 34 year old woman with a  with IUP at 11+0 weeks.  Right complex ovarian mass 9.5 x 7 cm  Prior history of   ECOG performance status 0        Plan:     1.)   I reviewed today's images and discussed the finding of the right ovarian mass with the patient and her .  I assured them overall ovarian masses in pregnancy are benign.  The risk of malignancy is low but is more likely associated with evidence of increasing size of the ovarian mass with associated symptoms such as ascites.    The recommended approach to management is surgical intervention if there is acute pain.  Beyond the symptoms indication for surgical intervention is increasing ovarian mass, complex ovarian mass greater than 10 cm and personal or family history of ovarian cancer.  The optimal time for surgical intervention if warranted is in the second trimester.  Tumor marker testing is not routinely obtained in the setting of pregnancy as the interpretation can be " complicated by the presence of antigens secreted during the course of pregnancy, although tumor markers can be very helpful in follow-up should there be diagnosis of cancer.     The pelvic ultrasound reports possible dermoid and obtaining an MRI would help to further characterize the mass.  We will consult with her primary OB/GYN clinic and obtained an MRI.     Patient has an upcoming visit with her OB/GYN in the next 2 weeks.  I have informed her that if she has any acute pain she should notify our clinic for more immediate intervention.  As she is currently at 11 weeks and planning to travel in the next 2 weeks it is safe for her to do so and have a follow-up in [facility] to determine any increase in the size of the mass and consider possible surgical intervention if warranted.  Both a laparoscopy and laparotomy and be considered based on the character of the mass, size and risk of malignancy. Alterntively if the mass is decreasing in size, further evaluation can be performed with her planned  (per patient report).    All of the patient's and her  questions were answered today.    Neck steps for follow-up including obtaining MRI and follow-up in the GYN oncology clinic with any acute symptom ahead of her upcoming travel.      2.) Genetic risk factors were assessed: None today    3.) Labs and/or tests ordered include:  None today         Betty Marsh M.D., MPH,  F.A.C.O.G.  Professor  Division of Gynecologic Oncology  [facility]/[employer] [place]        Total video visit time 35 minutes      Again, thank you for allowing me to participate in the care of your patient.        Sincerely,        Betty Marsh MD

## 2020-07-02 ENCOUNTER — TELEPHONE (OUTPATIENT)
Dept: OBGYN | Facility: CLINIC | Age: 35
End: 2020-07-02

## 2020-07-02 NOTE — TELEPHONE ENCOUNTER
Patient needs documentation of pregnancy including provider signature for return to [place].  will come . Please call when ready.

## 2020-07-02 NOTE — LETTER
Sherrill James, 1985  [address] [place] [address]    To Whom it may concern,       Sherrill is currently pregnant with an estimated due date of January 24th, 2021.          MD:  Addison Alegria MD

## 2020-07-02 NOTE — TELEPHONE ENCOUNTER
Patient will be traveling to [place] and needs documentation of the pregnancy to be able to quarantine at home when they arrive.    Records printed and placed at the .      Monse Karimi RN on 7/2/2020 at 9:05 AM

## 2021-01-04 ENCOUNTER — HEALTH MAINTENANCE LETTER (OUTPATIENT)
Age: 36
End: 2021-01-04

## 2021-10-10 ENCOUNTER — HEALTH MAINTENANCE LETTER (OUTPATIENT)
Age: 36
End: 2021-10-10

## 2022-01-30 ENCOUNTER — HEALTH MAINTENANCE LETTER (OUTPATIENT)
Age: 37
End: 2022-01-30

## 2022-09-24 ENCOUNTER — HEALTH MAINTENANCE LETTER (OUTPATIENT)
Age: 37
End: 2022-09-24

## 2023-05-08 ENCOUNTER — HEALTH MAINTENANCE LETTER (OUTPATIENT)
Age: 38
End: 2023-05-08